# Patient Record
Sex: MALE | Race: BLACK OR AFRICAN AMERICAN | Employment: OTHER | URBAN - METROPOLITAN AREA
[De-identification: names, ages, dates, MRNs, and addresses within clinical notes are randomized per-mention and may not be internally consistent; named-entity substitution may affect disease eponyms.]

---

## 2021-09-16 PROCEDURE — 99284 EMERGENCY DEPT VISIT MOD MDM: CPT

## 2021-09-17 ENCOUNTER — HOSPITAL ENCOUNTER (EMERGENCY)
Age: 76
Discharge: HOME | End: 2021-09-17
Attending: EMERGENCY MEDICINE
Payer: MEDICARE

## 2021-09-17 ENCOUNTER — APPOINTMENT (OUTPATIENT)
Dept: CT IMAGING | Age: 76
End: 2021-09-17
Attending: EMERGENCY MEDICINE
Payer: MEDICARE

## 2021-09-17 VITALS
OXYGEN SATURATION: 99 % | TEMPERATURE: 98.4 F | SYSTOLIC BLOOD PRESSURE: 118 MMHG | HEIGHT: 71 IN | HEART RATE: 98 BPM | WEIGHT: 169 LBS | RESPIRATION RATE: 18 BRPM | BODY MASS INDEX: 23.66 KG/M2 | DIASTOLIC BLOOD PRESSURE: 80 MMHG

## 2021-09-17 DIAGNOSIS — R46.89 WANDERING BEHAVIOR: Primary | ICD-10-CM

## 2021-09-17 LAB
ANION GAP SERPL CALC-SCNC: 8 MMOL/L (ref 5–15)
APPEARANCE UR: CLEAR
BACTERIA URNS QL MICRO: NEGATIVE /HPF
BASOPHILS # BLD: 0 K/UL (ref 0–0.1)
BASOPHILS NFR BLD: 0 % (ref 0–1)
BILIRUB UR QL: NEGATIVE
BUN SERPL-MCNC: 12 MG/DL (ref 6–20)
BUN/CREAT SERPL: 12 (ref 12–20)
CALCIUM SERPL-MCNC: 8.8 MG/DL (ref 8.5–10.1)
CHLORIDE SERPL-SCNC: 103 MMOL/L (ref 97–108)
CO2 SERPL-SCNC: 29 MMOL/L (ref 21–32)
COLOR UR: ABNORMAL
CREAT SERPL-MCNC: 1.03 MG/DL (ref 0.7–1.3)
DIFFERENTIAL METHOD BLD: ABNORMAL
EOSINOPHIL # BLD: 0.1 K/UL (ref 0–0.4)
EOSINOPHIL NFR BLD: 1 % (ref 0–7)
EPITH CASTS URNS QL MICRO: ABNORMAL /LPF
ERYTHROCYTE [DISTWIDTH] IN BLOOD BY AUTOMATED COUNT: 14.4 % (ref 11.5–14.5)
GLUCOSE SERPL-MCNC: 114 MG/DL (ref 65–100)
GLUCOSE UR STRIP.AUTO-MCNC: NEGATIVE MG/DL
HCT VFR BLD AUTO: 37.4 % (ref 36.6–50.3)
HGB BLD-MCNC: 11.8 G/DL (ref 12.1–17)
HGB UR QL STRIP: NEGATIVE
IMM GRANULOCYTES # BLD AUTO: 0 K/UL (ref 0–0.04)
IMM GRANULOCYTES NFR BLD AUTO: 0 % (ref 0–0.5)
KETONES UR QL STRIP.AUTO: NEGATIVE MG/DL
LEUKOCYTE ESTERASE UR QL STRIP.AUTO: ABNORMAL
LYMPHOCYTES # BLD: 0.8 K/UL (ref 0.8–3.5)
LYMPHOCYTES NFR BLD: 11 % (ref 12–49)
MCH RBC QN AUTO: 26.5 PG (ref 26–34)
MCHC RBC AUTO-ENTMCNC: 31.6 G/DL (ref 30–36.5)
MCV RBC AUTO: 84 FL (ref 80–99)
MONOCYTES # BLD: 0.5 K/UL (ref 0–1)
MONOCYTES NFR BLD: 7 % (ref 5–13)
NEUTS SEG # BLD: 5.5 K/UL (ref 1.8–8)
NEUTS SEG NFR BLD: 81 % (ref 32–75)
NITRITE UR QL STRIP.AUTO: NEGATIVE
NRBC # BLD: 0 K/UL (ref 0–0.01)
NRBC BLD-RTO: 0 PER 100 WBC
PH UR STRIP: 6 [PH] (ref 5–8)
PLATELET # BLD AUTO: 252 K/UL (ref 150–400)
PMV BLD AUTO: 8.7 FL (ref 8.9–12.9)
POTASSIUM SERPL-SCNC: 4.2 MMOL/L (ref 3.5–5.1)
PROT UR STRIP-MCNC: NEGATIVE MG/DL
RBC # BLD AUTO: 4.45 M/UL (ref 4.1–5.7)
RBC #/AREA URNS HPF: ABNORMAL /HPF (ref 0–5)
RBC MORPH BLD: ABNORMAL
SODIUM SERPL-SCNC: 140 MMOL/L (ref 136–145)
SP GR UR REFRACTOMETRY: 1.02 (ref 1–1.03)
UA: UC IF INDICATED,UAUC: ABNORMAL
UROBILINOGEN UR QL STRIP.AUTO: 0.2 EU/DL (ref 0.2–1)
WBC # BLD AUTO: 6.9 K/UL (ref 4.1–11.1)
WBC URNS QL MICRO: ABNORMAL /HPF (ref 0–4)

## 2021-09-17 PROCEDURE — 36415 COLL VENOUS BLD VENIPUNCTURE: CPT

## 2021-09-17 PROCEDURE — 70450 CT HEAD/BRAIN W/O DYE: CPT

## 2021-09-17 PROCEDURE — 80048 BASIC METABOLIC PNL TOTAL CA: CPT

## 2021-09-17 PROCEDURE — 81001 URINALYSIS AUTO W/SCOPE: CPT

## 2021-09-17 PROCEDURE — 85025 COMPLETE CBC W/AUTO DIFF WBC: CPT

## 2021-09-17 NOTE — ED NOTES
RN called non-emergent [employer] to inquire about a possible missing person's report. They said they will contact the officer that responded and called EMS/[facility].

## 2021-09-17 NOTE — ED TRIAGE NOTES
Patient to ED via EMS. Patient was found wondering on the road. Patient has dementia and unable to recall events. Oriented to person but not place or time.

## 2021-09-17 NOTE — PROGRESS NOTES
FLORA    Called from ER this am at 6AM.   Unable to verify where patient came from. Pick-up -wandering. Asked Nursing to call APS - reference # for call back. Will asked Legal to do a family search to see if we can find any other information. Will see patient to see if can give us any other information. Aubree Valverde MSW RN   [phone]      Addendum:  8:38AM   Legal is doing a family search now and will get back with me. Let me know when APS calls       Addendum : 9;32   Thanks to help with everyone working together. Nurse looked back through his Daylene Cease and found lots of old cards and one valid his insurance - ( registration)  Called and was given contact information for the daughter. Wants to get more information from the daughter for our records in-case he comes back again.

## 2021-09-17 NOTE — ED PROVIDER NOTES
EMERGENCY DEPARTMENT HISTORY AND PHYSICAL EXAM      Date: 9/17/2021  Patient Name: Eric Ramirez    History of Presenting Illness     Chief Complaint   Patient presents with    Altered mental status       History Provided By: Patient, EMS and Law Enforcement    HPI: Eric Ramirez, 77 y.o. male presents to the ED with cc of wandering. 79-year-old male with a reported history of dementia by EMS presents emergency department after being found wandering. Patient is unable to tell me the events leading to his arrival.  He is able to tell me his name but other history is unclear. No documented history. Patient denies any complaints. He denies any headaches, fevers or chills, chest pain, shortness of breath, abdominal pain, nausea, vomiting or diarrhea. When asked regarding medical issues he points to his chest where he has a port in place. There are no other complaints, changes, or physical findings at this time. PCP: None    No current facility-administered medications on file prior to encounter. No current outpatient medications on file prior to encounter. Past History     Past Medical History:  No past medical history on file. Past Surgical History:  No past surgical history on file. Family History:  No family history on file. Social History:  Social History     Tobacco Use    Smoking status: Not on file   Substance Use Topics    Alcohol use: Not on file    Drug use: Not on file       Allergies:  No Known Allergies      Review of Systems   Review of Systems   Unable to perform ROS: Mental status change       Physical Exam   Physical Exam  Vitals and nursing note reviewed. Constitutional:       Comments: 79-year-old male, sitting in chair, calm and cooperative in no distress   HENT:      Head: Normocephalic and atraumatic.       Right Ear: External ear normal.      Left Ear: External ear normal.      Nose: Nose normal.   Eyes:      Conjunctiva/sclera: Conjunctivae normal.      Pupils: Pupils are equal, round, and reactive to light. Cardiovascular:      Rate and Rhythm: Normal rate and regular rhythm. Heart sounds: No murmur heard. No friction rub. No gallop. Pulmonary:      Effort: Pulmonary effort is normal.      Breath sounds: Normal breath sounds. No wheezing, rhonchi or rales. Chest:      Comments: Port in place L chest, no erythema or warmth  Abdominal:      Palpations: Abdomen is soft. Tenderness: There is no abdominal tenderness. Musculoskeletal:         General: Normal range of motion. Skin:     General: Skin is warm. Capillary Refill: Capillary refill takes less than 2 seconds. Neurological:      Mental Status: He is alert. Cranial Nerves: No cranial nerve deficit. Sensory: No sensory deficit. Motor: No weakness. Comments: Alert x1   Psychiatric:         Mood and Affect: Mood normal.         Behavior: Behavior normal.         Diagnostic Study Results     Labs -     Recent Results (from the past 12 hour(s))   CBC WITH AUTOMATED DIFF    Collection Time: 09/17/21 12:40 AM   Result Value Ref Range    WBC 6.9 4.1 - 11.1 K/uL    RBC 4.45 4.10 - 5.70 M/uL    HGB 11.8 (L) 12.1 - 17.0 g/dL    HCT 37.4 36.6 - 50.3 %    MCV 84.0 80.0 - 99.0 FL    MCH 26.5 26.0 - 34.0 PG    MCHC 31.6 30.0 - 36.5 g/dL    RDW 14.4 11.5 - 14.5 %    PLATELET 594 825 - 098 K/uL    MPV 8.7 (L) 8.9 - 12.9 FL    NRBC 0.0 0  WBC    ABSOLUTE NRBC 0.00 0.00 - 0.01 K/uL    NEUTROPHILS 81 (H) 32 - 75 %    LYMPHOCYTES 11 (L) 12 - 49 %    MONOCYTES 7 5 - 13 %    EOSINOPHILS 1 0 - 7 %    BASOPHILS 0 0 - 1 %    IMMATURE GRANULOCYTES 0 0.0 - 0.5 %    ABS. NEUTROPHILS 5.5 1.8 - 8.0 K/UL    ABS. LYMPHOCYTES 0.8 0.8 - 3.5 K/UL    ABS. MONOCYTES 0.5 0.0 - 1.0 K/UL    ABS. EOSINOPHILS 0.1 0.0 - 0.4 K/UL    ABS. BASOPHILS 0.0 0.0 - 0.1 K/UL    ABS. IMM.  GRANS. 0.0 0.00 - 0.04 K/UL    DF SMEAR SCANNED      RBC COMMENTS ANISOCYTOSIS  1+       METABOLIC PANEL, BASIC Collection Time: 09/17/21 12:40 AM   Result Value Ref Range    Sodium 140 136 - 145 mmol/L    Potassium 4.2 3.5 - 5.1 mmol/L    Chloride 103 97 - 108 mmol/L    CO2 29 21 - 32 mmol/L    Anion gap 8 5 - 15 mmol/L    Glucose 114 (H) 65 - 100 mg/dL    BUN 12 6 - 20 MG/DL    Creatinine 1.03 0.70 - 1.30 MG/DL    BUN/Creatinine ratio 12 12 - 20      GFR est AA >60 >60 ml/min/1.73m2    GFR est non-AA >60 >60 ml/min/1.73m2    Calcium 8.8 8.5 - 10.1 MG/DL   URINALYSIS W/ REFLEX CULTURE    Collection Time: 09/17/21 12:40 AM    Specimen: Urine   Result Value Ref Range    Color YELLOW/STRAW      Appearance CLEAR CLEAR      Specific gravity 1.025 1.003 - 1.030      pH (UA) 6.0 5.0 - 8.0      Protein Negative NEG mg/dL    Glucose Negative NEG mg/dL    Ketone Negative NEG mg/dL    Bilirubin Negative NEG      Blood Negative NEG      Urobilinogen 0.2 0.2 - 1.0 EU/dL    Nitrites Negative NEG      Leukocyte Esterase SMALL (A) NEG      WBC 0-4 0 - 4 /hpf    RBC 0-5 0 - 5 /hpf    Epithelial cells FEW FEW /lpf    Bacteria Negative NEG /hpf    UA:UC IF INDICATED CULTURE NOT INDICATED BY UA RESULT CNI         Radiologic Studies -   CT HEAD WO CONT   Final Result   No acute intracranial process. CT Results  (Last 48 hours)               09/17/21 0129  CT HEAD WO CONT Final result    Impression:  No acute intracranial process. Narrative:  EXAM: CT HEAD WO CONT       INDICATION: wandering, h/o dementia, unclear baseline       COMPARISON: None. CONTRAST: None. TECHNIQUE: Unenhanced CT of the head was performed using 5 mm images. Brain and   bone windows were generated. Coronal and sagittal reformats. CT dose reduction   was achieved through use of a standardized protocol tailored for this   examination and automatic exposure control for dose modulation. FINDINGS:   The ventricles and sulci are appropriate in size, shape and configuration. .   There is mild periventricular white matter disease. There is no intracranial   hemorrhage, extra-axial collection, or mass effect. The basilar cisterns are   open. No CT evidence of acute infarct. The bone windows demonstrate no abnormalities. The visualized portions of the   paranasal sinuses and mastoid air cells are clear. CXR Results  (Last 48 hours)    None          Medical Decision Making   I am the first provider for this patient. I reviewed the vital signs, available nursing notes, past medical history, past surgical history, family history and social history. Vital Signs-Reviewed the patient's vital signs. Patient Vitals for the past 12 hrs:   Temp Pulse Resp BP SpO2   09/17/21 0319  98 18 118/80 99 %   09/17/21 0013 98.4 °F (36.9 °C) (!) 118 20 114/79 100 %     Records Reviewed: Nursing Notes and Old Medical Records    Provider Notes (Medical Decision Making):     28-year-old male arrives via EMS after being found wandering on the street. There is a reported history of dementia. Difficult as patient is unable to provide history but he endorses no complaints. He is mildly tachycardic on triage but appears well. Given his age and no clear history, while awaiting collateral, will CT head, check basic labs and urinalysis. ED Course:   Initial assessment performed. The patients presenting problems have been discussed, and they are in agreement with the care plan formulated and outlined with them. I have encouraged them to ask questions as they arise throughout their visit. ED Course as of Sep 19 2017   Fri Sep 17, 2021   0135 Labs are unremarkable. Urinalysis without infection. In the interim, charge nurse spoke with [facility], no missing persons report. They did go up and down the block he was missing on. [MB]   0200 CT head negative    [MB]   0304 Patient resting in bed, will place CM consult to help locate family.     [MB]   0322 Pulse (Heart Rate): 98 [MB]   0617 Reviewed external pharmacy records, patient prescribed memantine. Will attempt to call patient's neurologist Dr. Leonard Chris at [facility]. [MB]   8152 Case mgmt attempting family search, APS to be notified. [MB]   0681 Spoke to on-call neurologist, will contact office once open to attempt to obtain records or emergency contact. [MB]   0745 Received a call from the patient's neurologist, he states that there is some listed patient information includin daughter  [phone] cell number possibly for patient  [address] [phone] listed as patient address     [CM]   0988 We were able to get a hold of the daughter for this patient, she has arrived in the emergency department, she is states that he does have a history of dementia, however he has never wandered off like this before. She is comfortable taking him home and will be able to care for him, will follow up with his neurologist as well as primary care doctor. Patient is ambulating around, denies complaints, states he is very excited to go home.    [CM]      ED Course User Index  [CM] Daja Portillo MD  [MB] MD Toyin Mary MD      Disposition:    Discharged      DISCHARGE PLAN:  1. There are no discharge medications for this patient. 2.   Follow-up Information     Follow up With Specialties Details Why [facility] EMERGENCY DEPT Emergency Medicine  If symptoms worsen [address]  In 1 week  [address]        3. Return to ED if worse     Diagnosis     Clinical Impression:   1. Wandering behavior        Attestations:    Toyin Alegre MD    Please note that this dictation was completed with [employer], the [employer] voice recognition software. Quite often unanticipated grammatical, syntax, homophones, and other interpretive errors are inadvertently transcribed by the computer software. Please disregard these errors. Please excuse any errors that have escaped final proofreading. Thank you.

## 2021-09-17 NOTE — ED NOTES
Patient sitting up in chair in the room. Patient calm and cooperative however disoriented. Patient injection port assessed in left upper chest, patient unable to offer any history regarding purpose of the port.

## 2021-09-17 NOTE — ED NOTES
RN speaking with Haley Steiner with [employer] and they initiated a case (# [id number]) and said they will call back.

## 2021-09-17 NOTE — PROGRESS NOTES
CM met with patient for assessment. Alert and orient to person. Unable to stated where he lives or how he got to the hospital.  Patient had finished breakfast, well dressed and polite. Has his belongings from wallet out on bedside table. Drivers license, insurance cards, card from [employer]. When given prompt of address from 's license patient stated that he lives alone. Unable to provide any further information. SHARON spoke with nursing. Daughter, Jared Torres [phone], will be here within the hour to provide transportation home.     FANNY Leal/SHARON  [phone]

## 2021-09-17 NOTE — ED NOTES
RN spoke to Radha Eddy with CM. She says she will try and do a family search when she comes in. She suggested to call APS.

## 2021-09-17 NOTE — ED NOTES
Verbal shift change report given to SHAY RN  (oncoming nurse) by Loree Harrison RN  (offgoing nurse). Report included the following information SBAR, ED Summary, Intake/Output, MAR and Recent Results.

## 2021-09-17 NOTE — ED NOTES
RN spoke to [employer] with [employer] who found patient wandering around. The officer says pt told him he [address] ([address] Officer says once he brought the patient to the area, pt could not remember where his apartment was. Pt told him he lives alone. Officer says there is no missing person's report. Pt's address on his ID was run and it is 30 minutes away and not current. He says pt requested ambulance.

## 2021-09-17 NOTE — ED NOTES
Called [employer] and spoke to representative who was able to provide an emergency contact phone number. Called patient's daughter Susy @ [phone]. Daughter confirms that patient's address is the one on his license, [address]. Patient daughter states that she can be to the hospital within the hours to get him.

## 2021-09-17 NOTE — ED NOTES
PT AMBULATORY TO BATHROOM AND BACK TO ROOM. PT ASKS IF HE IS STILL A PATIENT. PT NOTIFIED THAT HE IS. PT UNABLE TO STATE WHERE HE LIVES OR WHETHER HE LIVES WITH SOMEONE OR BY HIMSELF. PT UPDATED ON PLAN OF CARE. PT VERBALIZED UNDERSTANDING AND SITS IN CHAIR.

## 2021-09-17 NOTE — ED NOTES
Called and spoke with pt and notified that Dr. Serrano recommend him to be seen by neurologist. RN assist pt to schedule for our neurologist Dr. Ospina on 3/9 @ 11 AM. Pt happy and thanked RN.   RN attempted to call the number on patient's chart with no answer ([phone]).

## 2023-01-06 ENCOUNTER — HOSPITAL ENCOUNTER (EMERGENCY)
Age: 78
Discharge: NURSING, REHAB OR LONG-TERM CARE | End: 2023-01-07
Attending: STUDENT IN AN ORGANIZED HEALTH CARE EDUCATION/TRAINING PROGRAM
Payer: MEDICARE

## 2023-01-06 VITALS — SYSTOLIC BLOOD PRESSURE: 115 MMHG | DIASTOLIC BLOOD PRESSURE: 66 MMHG

## 2023-01-06 DIAGNOSIS — N48.89 PENILE MASS: Primary | ICD-10-CM

## 2023-01-06 PROCEDURE — 99283 EMERGENCY DEPT VISIT LOW MDM: CPT

## 2023-01-06 NOTE — ED TRIAGE NOTES
Per EMS pt from nursing care facility with dementia. Staff call for bleeding from penis. Reports previous spot to penis that is started bleeding.

## 2023-01-06 NOTE — CONSULTS
Urology Consult    Patient: Pradeep De Los Santos MRN: [id number]  SSN: [id number]    YOB: 1945  Age: 69 y.o. Sex: male          Date of Consultation:  January 6, 2023  Requesting Physician: Trevor Zhao MD  Reason for Consultation:  Penile lesion          History of Present Illness:    68 y/o male with pmh significant for advanced lung cancer currently on hospice and dementia. Patient presented to ED from nursing facility with bleeding penile lesion. Urology has been consulted for evaluation of penile lesion, patient is not known to [facility]. Hx obtained from patient's daughter who is at bedside, she reports patient was transferred to ED from nursing facility after staff noted bleeding from lesions on penis. Patient's daughter also reports she was not aware of lesions prior to call from nursing facility. She has received no reports of difficulty voiding or decreased UOP in relation to lesion. On examination NAD, patient does not participate in examination. 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 Patient does not object to manipulation or palpation of the area. Findings were discussed with patient's daughter, who expressed the family's biggest concern is the patient's comfort, agressive intervention should be avoided, however she would like the patient seen on a OP basis for further evaluation. No past medical history on file. No past surgical history on file. No Known Allergies     Prior to Admission medications    Not on File       Social History     Tobacco Use    Smoking status: Not on file    Smokeless tobacco: Not on file   Substance Use Topics    Alcohol use: Not on file        No family history on file. ROS:  Admission ROS from 1/6/2023 was reviewed and changes (other than per HPI) include: none. Physical Exam:    Vital Signs:  No data recorded.    There were no vitals taken for this visit.  I&O's:  No intake/output data recorded. Appearance: Frail, NAD   Respiratory Effort: breathing easily   Abdomen/Flank: soft non-tender without masses  Scrotum: unable to assess  Testes: unable to assess  Penis:  lesions noted to glans penis  Meatus: patent     Lab Review:     CBC No results for input(s): WBC, HGB, HCT, PLT, HGBEXT, HCTEXT, PLTEXT in the last 72 hours. CMP No results for input(s): NA, K, CL, CO2, GLU, BUN, CREA, CA, MG, PHOS, ALB, TBIL, TBILI, ALT in the last 72 hours. No lab exists for component: SGOT    Other No results for input(s): INR, PSA, INREXT in the last 72 hours. No lab exists for component: PTT    UA:   Lab Results   Component Value Date/Time    Color YELLOW/STRAW 09/17/2021 12:40 AM    Appearance CLEAR 09/17/2021 12:40 AM    Specific gravity 1.025 09/17/2021 12:40 AM    pH (UA) 6.0 09/17/2021 12:40 AM    Protein Negative 09/17/2021 12:40 AM    Glucose Negative 09/17/2021 12:40 AM    Ketone Negative 09/17/2021 12:40 AM    Bilirubin Negative 09/17/2021 12:40 AM    Urobilinogen 0.2 09/17/2021 12:40 AM    Nitrites Negative 09/17/2021 12:40 AM    Leukocyte Esterase SMALL (A) 09/17/2021 12:40 AM    Epithelial cells FEW 09/17/2021 12:40 AM    Bacteria Negative 09/17/2021 12:40 AM    WBC 0-4 09/17/2021 12:40 AM    RBC 0-5 09/17/2021 12:40 AM       Cultures:  NA    Imaging:  NA    Assessment:     Active Problems:    * No active hospital problems.  *        Plan:     Penile Lesions   - No urgent intervention indicated at this time, patient is on hospice and family would like to prioritize patient's comfort  - Bladder scan ordered to ensure patient is not retaining urine  - OP FU with [facility] scheduled    Plan discussed with Dr. Lord Castillo and Dr. Latonya Palmer     Signed By: WILBER Jimenez  - January 6, 2023

## 2023-01-07 NOTE — ED NOTES
[facility] have [facility] [address] verbalized understanding. Pt is altered at baseline. No further questions at this time. Patient discharged via stretcher with [facility] in stable condition.

## 2023-01-07 NOTE — ED NOTES
RN called [facility] to give brief report update. [facility] in ED now to transport pt. Pt refusing to allow RN to get vitals before he leaves.

## 2023-01-07 NOTE — ED NOTES
Called to  [facility] [place] and  talked to   Oleksandr a [address] and  gave her the  low down on his visit here and  what the plan of care is. I will sent back notes and  such for the  NH.

## 2023-01-11 NOTE — ED PROVIDER NOTES
Patient is a 80-year-old male present emergency department for penile bleeding. Unable to obtain HPI from patient due to patient's underlying dementia. Patient's daughter bedside states that she was not aware or informed of any penile mass or bleeding. No past medical history on file. No past surgical history on file. No family history on file. Social History     Socioeconomic History    Marital status: SINGLE     Spouse name: Not on file    Number of children: Not on file    Years of education: Not on file    Highest education level: Not on file   Occupational History    Not on file   Tobacco Use    Smoking status: Not on file    Smokeless tobacco: Not on file   Substance and Sexual Activity    Alcohol use: Not on file    Drug use: Not on file    Sexual activity: Not on file   Other Topics Concern    Not on file   Social History Narrative    Not on file     Social Determinants of Health     Financial Resource Strain: Not on file   Food Insecurity: Not on file   Transportation Needs: Not on file   Physical Activity: Not on file   Stress: Not on file   Social Connections: Not on file   Intimate Partner Violence: Not on file   Housing Stability: Not on file         ALLERGIES: Patient has no known allergies. Review of Systems   Unable to perform ROS: Dementia     Vitals:    01/06/23 1758 01/06/23 1800   BP: 119/68 115/66            Physical Exam  Vitals and nursing note reviewed. Constitutional:       Appearance: Normal appearance. HENT:      Head: Normocephalic and atraumatic. Eyes:      Extraocular Movements: Extraocular movements intact. Pupils: Pupils are equal, round, and reactive to light. Cardiovascular:      Rate and Rhythm: Normal rate and regular rhythm. Pulses: Normal pulses. Heart sounds: Normal heart sounds. Pulmonary:      Effort: Pulmonary effort is normal.      Breath sounds: Normal breath sounds. Abdominal:      General: Abdomen is flat.       Palpations: Abdomen is soft. Genitourinary:     Penis: Swelling and lesions present. Comments: Patient with a large mass on the ventral aspect of the glans of the penis appears to be chronic not obstructing the meatus. Musculoskeletal:         General: Normal range of motion. Cervical back: Normal range of motion and neck supple. Skin:     General: Skin is warm and dry. Neurological:      Mental Status: He is alert. Mental status is at baseline. Psychiatric:         Mood and Affect: Affect is inappropriate. Behavior: Behavior is agitated, aggressive and combative. Medical Decision Making  Penile mass. 67-year-old male present emergency department with large penile mass to the glans of the penis. This appears to be chronic in nature. Spoke to urology regarding findings patient not obstructing based off of bedside bladder scan. Patient will be discharged with urology follow-up. Discussed this with the daughter who is in agreement with plan.     Amount and/or Complexity of Data Reviewed  Independent Historian: guardian and EMS           Procedures